# Patient Record
Sex: FEMALE | Race: WHITE | NOT HISPANIC OR LATINO | URBAN - METROPOLITAN AREA
[De-identification: names, ages, dates, MRNs, and addresses within clinical notes are randomized per-mention and may not be internally consistent; named-entity substitution may affect disease eponyms.]

---

## 2018-01-01 ENCOUNTER — OFFICE VISIT - HEALTHEAST (OUTPATIENT)
Dept: GERIATRICS | Facility: CLINIC | Age: 68
End: 2018-01-01

## 2018-01-01 ENCOUNTER — AMBULATORY - HEALTHEAST (OUTPATIENT)
Dept: ADMINISTRATIVE | Facility: CLINIC | Age: 68
End: 2018-01-01

## 2018-01-01 DIAGNOSIS — N18.6 ESRD (END STAGE RENAL DISEASE) (H): ICD-10-CM

## 2018-01-01 DIAGNOSIS — I10 ESSENTIAL HYPERTENSION: ICD-10-CM

## 2018-01-01 DIAGNOSIS — G93.40 ENCEPHALOPATHY: ICD-10-CM

## 2018-01-01 DIAGNOSIS — R53.1 WEAKNESS: ICD-10-CM

## 2018-01-01 RX ORDER — FAMOTIDINE 20 MG/1
20 TABLET, FILM COATED ORAL 2 TIMES DAILY
Status: SHIPPED | COMMUNITY
Start: 2018-01-01

## 2018-01-01 RX ORDER — MORPHINE SULFATE 20 MG/5ML
5.2 SOLUTION ORAL
Status: SHIPPED | COMMUNITY
Start: 2018-01-01

## 2018-01-01 RX ORDER — HYOSCYAMINE SULFATE 0.125 MG
0.12 TABLET ORAL EVERY 6 HOURS PRN
Status: SHIPPED | COMMUNITY
Start: 2018-01-01

## 2018-01-01 RX ORDER — TORSEMIDE 10 MG/1
50 TABLET ORAL DAILY
Status: SHIPPED | COMMUNITY
Start: 2018-01-01

## 2018-01-01 RX ORDER — PROCHLORPERAZINE MALEATE 10 MG
10 TABLET ORAL EVERY 6 HOURS PRN
Status: SHIPPED | COMMUNITY
Start: 2018-01-01

## 2018-01-01 RX ORDER — CARBIDOPA AND LEVODOPA 25; 100 MG/1; MG/1
1 TABLET ORAL 3 TIMES DAILY
Status: SHIPPED | COMMUNITY
Start: 2018-01-01

## 2018-01-01 RX ORDER — TRAZODONE HYDROCHLORIDE 50 MG/1
50 TABLET, FILM COATED ORAL AT BEDTIME
Status: SHIPPED | COMMUNITY
Start: 2018-01-01

## 2018-03-19 ENCOUNTER — AMBULATORY - HEALTHEAST (OUTPATIENT)
Dept: GERIATRICS | Facility: CLINIC | Age: 68
End: 2018-03-19

## 2021-06-16 PROBLEM — N18.6 ESRD (END STAGE RENAL DISEASE) (H): Status: ACTIVE | Noted: 2018-01-01

## 2021-06-16 NOTE — PROGRESS NOTES
[facility]    Facility:   [facility] [[id number]]   Code Status: POLST AVAILABLE    This visit took place 3/13 rather than 3/14.    Reassessment of 67 year old female admitted to hospital with progressive weakness and encephalopathy, extensive evaluation, progressive decline in clinical status, progressive renal impairment, decision made for hospice placement, transferred to TCU on hospice for terminal care.    Review of systems: patient obtunded and encephalopathic, not verbally responsive, unable to participate in review of systems.  present states his wife ate a small amount this morning, she has not spoken, declined  lunch, she stares off frequently, does not appear uncomfortable, he sits in a chair across the room observing her status. Is accepting of her terminal status. Nursing staff report patient with progressive decline in clinical status, no evidence of pain or respiratory distress.    Exam: vital signs reviewed over past five days. Patient lying in bed, frequently stares upward and to the right, on several occasions grasps hand at the air. In no apparent distress. Scattered ecchymoses forearms and legs, trace edema nonpitting lower extremity. Mucous membranes moist. No carotid bruits. S1 and S2 regular. Pulmonary exam without rales or wheezes. Abdomen without masses or withdrawal, normal active bowel sounds. Skin turgor diminished.    Impression and plan: end-stage status, encephalopathy, no suggestion of seizure activity, end-stage renal disease, progressive renal insufficiency during hospitalization. History of chronic tobacco habituation, pulmonary status stable. Hypothyroidism on replacement. Recent diagnosis of Parkinson's, continues Sinemet. Poor PO intake consistent with generalized decline. Discussion with nursing staff and patient's  in attendance,  with concerns regarding expected length of survival. Medications managed by hospice.  Medications reviewed and are without change.      Electronically signed by: Arabella Martinez MD

## 2021-06-16 NOTE — PROGRESS NOTES
[facility]    Facility:   [facility] [[id number]]   Code Status: POLST AVAILABLE      Reassessment of 67 year old female with progressive failure to thrive, encephalopathy, hospitalized with weakness and diarrhea, multiple sub specialty evaluations, decision made for hospice placement, transferred to TCU for terminal care.    Review of systems: not possible, patient encephalopathic and deaf pending. Patient is seen on two occasions by 's request. No PO intake over past 24 hours.  concerned regarding length of time to death, concerned regarding patient's lack of PO intake. Reviews her medical and personal history. One son living, out of town and not present.    Exam: vital signs per hospice. Patient not verbally responsive, appears comfortable, no evidence of seizure activity. Variable respiratory pattern, no air hunger. No facial asymmetry. Secretions intermittently audible upper airway. Appears able to clear secretions. S1 and S2 regular. Pulmonary exam without rales or wheezes. Abdomen without masses or withdrawal. Periphery with nonpitting edema trace. No tremor.    Impression and plan: pending test, primary diagnosis end-stage kidney failure, additional diagnoses coronary artery disease, Parkinson's, hypothyroidism, failure to thrive and multiple end organ decline. Concerns of  reviewed at length, two evaluations on this date, medication per hospice.    Electronically signed by: Arabella Martinez MD

## 2021-06-16 NOTE — PROGRESS NOTES
[facility]    Facility:   [facility] [[id number]]   Code Status: POLST AVAILABLE    This visit took place 3/8/18, patient not entered in system on that date.    Admission to TCU on hospice of 67-year-old female. History is taken from accompanying extensive hospital notes and from  in attendance. Initial presentation to hospital was with complaints of weakness. Patient had eaten poorly for 11 days,diarrhea, known coronary artery disease, anemia,  depression and anxiety, GERD, hyperlipidemia, hypothyroidism, COPD,diabetes mellitus type II, acute on chronic renal injury in hospital, extensive evaluation including CT and MRI of head, nephrology review, psychiatry review,neurology review, treated for UTI, attempts at rehabilitation, progressive decline in clinical status, neurology suspected Parkinson's, started on Sinemet, continued encephalopathy and failure to thrive, decision made to place patient on hospice, progressive end-stage multi-system failure, significant renal impairment progressive, signed to hospice 24 hours ago.    Past medical history, current medical problem list, drug allergies, DNR DNI code status, current medications, family history and social history reviewed in epic.    Review of systems: patient denies pain. Admits to confusion. Is able to answer several questions, accuracy of answers unclear.  provides extensive additional history. Review of systems is unreliable and incomplete due to patient's inability to participate.    Exam: vital signs reviewed since admission to TCU. The patient is not oriented. Lying in bed, upward deviation of eyes intermittently, smiles on rare occasion, responds rarely to questions, rarely follows command. No facial asymmetry. Mucous membranes moist. Mild increased edema facial hands and lower extremities. Moves all extremities. No fasciculations or tremor. Skin turgor intact. Multiple ecchymoses of skin, no active  bleeding. Unable to participate in strength testing upper and lower extremities. Thyroid without nodularity. No carotid bruits or HJR. S1 and S2 regular. Pulmonary exam without rales rhonchi or wheezes, decreased air movement bases. Abdomen without distention, normoactive bowl sounds, no organomegaly. Joints without acute inflammatory change. No calf tenderness or swelling.    Impression and plan: progressive encephalopathy, metabolic and multifactorial, end-stage renal process, chronic kidney disease with recent acute kidney injury, nil PO intake, history of coronary artery disease and hypertension, history of hypothyroidism, COPD with satisfactory oxygenation and not requiring supplemental 02,  dementia secondary to Parkinson's, recent initiation of Sinemet for potential Parkinson's, chronic anxiety and depression, psychiatry evaluated in hospital on three occasions, catatonia ruled out.  is comfortable with decision for hospice placement, patient with multiple issues contributing to failure. Morphine and Lorazepam are available, patient appears comfortable at this time, hospice to manage medications, total time of admission evaluation greater than 45 minutes, greater than 50% of time spent in coordination of care and counseling.      Electronically signed by: Arabella Martinez MD